# Patient Record
(demographics unavailable — no encounter records)

---

## 2024-11-15 NOTE — HISTORY OF PRESENT ILLNESS
[FreeTextEntry1] : cpe [de-identified] : 37F with PMH of colon cancer diagnosed in 2020 s/p chemo (finished in 2021), radiation therapy (11/2020), and colostomy reversal (2021) here for cpe. Pt came from [place] in June, 2024. Pt reports amenorrhea since treatment of cancer. Pt has hx of HSIL in 2023 with subsequent conization of cervix revealing low-grade squamous interepithelial lesions and surgical margin free of lesions.  No other acute complaints.

## 2024-11-15 NOTE — HISTORY OF PRESENT ILLNESS
[FreeTextEntry1] : cpe [de-identified] : 37F with PMH of colon cancer diagnosed in 2020 s/p chemo (finished in 2021), radiation therapy (11/2020), and colostomy reversal (2021) here for cpe. Pt came from [place] in June, 2024. Pt reports amenorrhea since treatment of cancer. Pt has hx of HSIL in 2023 with subsequent conization of cervix revealing low-grade squamous interepithelial lesions and surgical margin free of lesions.  No other acute complaints.

## 2024-11-15 NOTE — PHYSICAL EXAM
[No Acute Distress] : no acute distress [Well Nourished] : well nourished [Well Developed] : well developed [Well-Appearing] : well-appearing [Normal Sclera/Conjunctiva] : normal sclera/conjunctiva [PERRL] : pupils equal round and reactive to light [EOMI] : extraocular movements intact [Normal Outer Ear/Nose] : the outer ears and nose were normal in appearance [Normal Oropharynx] : the oropharynx was normal [Supple] : supple [No Respiratory Distress] : no respiratory distress  [No Accessory Muscle Use] : no accessory muscle use [Clear to Auscultation] : lungs were clear to auscultation bilaterally [Normal Rate] : normal rate  [Regular Rhythm] : with a regular rhythm [Normal S1, S2] : normal S1 and S2 [No Murmur] : no murmur heard [No Edema] : there was no peripheral edema [Soft] : abdomen soft [Non Tender] : non-tender [Non-distended] : non-distended [No Masses] : no abdominal mass palpated [Normal Bowel Sounds] : normal bowel sounds [No Spinal Tenderness] : no spinal tenderness [No Joint Swelling] : no joint swelling [Grossly Normal Strength/Tone] : grossly normal strength/tone [No Rash] : no rash [Normal Gait] : normal gait [Alert and Oriented x3] : oriented to person, place, and time [de-identified] : well healed incision scar on left lower abdomen

## 2024-11-15 NOTE — REVIEW OF SYSTEMS
[Constipation] : constipation [Diarrhea] : diarrhea [Negative] : Heme/Lymph [FreeTextEntry8] : ammenorrhea

## 2024-11-15 NOTE — PHYSICAL EXAM
[No Acute Distress] : no acute distress [Well Nourished] : well nourished [Well Developed] : well developed [Well-Appearing] : well-appearing [Normal Sclera/Conjunctiva] : normal sclera/conjunctiva [PERRL] : pupils equal round and reactive to light [EOMI] : extraocular movements intact [Normal Outer Ear/Nose] : the outer ears and nose were normal in appearance [Normal Oropharynx] : the oropharynx was normal [Supple] : supple [No Respiratory Distress] : no respiratory distress  [No Accessory Muscle Use] : no accessory muscle use [Clear to Auscultation] : lungs were clear to auscultation bilaterally [Normal Rate] : normal rate  [Regular Rhythm] : with a regular rhythm [Normal S1, S2] : normal S1 and S2 [No Murmur] : no murmur heard [No Edema] : there was no peripheral edema [Soft] : abdomen soft [Non Tender] : non-tender [Non-distended] : non-distended [No Masses] : no abdominal mass palpated [Normal Bowel Sounds] : normal bowel sounds [No Spinal Tenderness] : no spinal tenderness [No Joint Swelling] : no joint swelling [Grossly Normal Strength/Tone] : grossly normal strength/tone [No Rash] : no rash [Normal Gait] : normal gait [Alert and Oriented x3] : oriented to person, place, and time [de-identified] : well healed incision scar on left lower abdomen

## 2024-11-15 NOTE — HEALTH RISK ASSESSMENT
[Fair] :  ~his/her~ mood as fair [No] : In the past 12 months have you used drugs other than those required for medical reasons? No [0] : 2) Feeling down, depressed, or hopeless: Not at all (0) [With Family] : lives with family [Unemployed] : unemployed [Single] : single [# Of Children ___] : has [unfilled] children [Feels Safe at Home] : Feels safe at home [Fully functional (bathing, dressing, toileting, transferring, walking, feeding)] : Fully functional (bathing, dressing, toileting, transferring, walking, feeding) [Fully functional (using the telephone, shopping, preparing meals, housekeeping, doing laundry, using] : Fully functional and needs no help or supervision to perform IADLs (using the telephone, shopping, preparing meals, housekeeping, doing laundry, using transportation, managing medications and managing finances) [Reports normal functional visual acuity (ie: able to read med bottle)] : Reports normal functional visual acuity [Never] : Never [de-identified] : no [de-identified] : well balance diet [[id number]] : 0 [Sexually Active] : not sexually active [High Risk Behavior] : no high risk behavior [Reports changes in hearing] : Reports no changes in hearing [Reports changes in vision] : Reports no changes in vision [Reports changes in dental health] : Reports no changes in dental health [de-identified] : sisters  [FreeTextEntry3] : 17 years old

## 2024-11-15 NOTE — HEALTH RISK ASSESSMENT
[Fair] :  ~his/her~ mood as fair [No] : In the past 12 months have you used drugs other than those required for medical reasons? No [0] : 2) Feeling down, depressed, or hopeless: Not at all (0) [With Family] : lives with family [Unemployed] : unemployed [Single] : single [# Of Children ___] : has [unfilled] children [Feels Safe at Home] : Feels safe at home [Fully functional (bathing, dressing, toileting, transferring, walking, feeding)] : Fully functional (bathing, dressing, toileting, transferring, walking, feeding) [Fully functional (using the telephone, shopping, preparing meals, housekeeping, doing laundry, using] : Fully functional and needs no help or supervision to perform IADLs (using the telephone, shopping, preparing meals, housekeeping, doing laundry, using transportation, managing medications and managing finances) [Reports normal functional visual acuity (ie: able to read med bottle)] : Reports normal functional visual acuity [Never] : Never [de-identified] : no [de-identified] : well balance diet [[id number]] : 0 [Sexually Active] : not sexually active [High Risk Behavior] : no high risk behavior [Reports changes in hearing] : Reports no changes in hearing [Reports changes in vision] : Reports no changes in vision [Reports changes in dental health] : Reports no changes in dental health [de-identified] : sisters  [FreeTextEntry3] : 17 years old

## 2024-11-26 NOTE — HISTORY OF PRESENT ILLNESS
[de-identified] : 36 y/o F from [place] with PMHx of colon cancer diagnosed in 2020 s/p chemo (finished in 2021), radiation therapy (11/2020), and colostomy reversal (2021), s/p conization 3/2023 in [place]- presents to clinic for pap smear. Family history of cancer- grandmother (doesn't know what type). Denies abnormal vaginal bleeding or discharge, pain during intercourse, pelvic/back pain, or unintentional weight loss. LMP on 08/2020. Pt says since getting chemotherapy she has not had a menstrual period.

## 2024-11-26 NOTE — PHYSICAL EXAM
[No Acute Distress] : no acute distress [Well-Appearing] : well-appearing [Supple] : supple [No Respiratory Distress] : no respiratory distress  [No Accessory Muscle Use] : no accessory muscle use [Clear to Auscultation] : lungs were clear to auscultation bilaterally [Normal Rate] : normal rate  [Regular Rhythm] : with a regular rhythm [Normal S1, S2] : normal S1 and S2 [Soft] : abdomen soft [Non Tender] : non-tender [Non-distended] : non-distended [Vagina] : normal vaginal exam [No Focal Deficits] : no focal deficits [Normal Gait] : normal gait [Normal Affect] : the affect was normal [Alert and Oriented x3] : oriented to person, place, and time [de-identified] : chaperone PCA Barbara

## 2024-11-26 NOTE — HISTORY OF PRESENT ILLNESS
[de-identified] : 38 y/o F from [place] with PMHx of colon cancer diagnosed in 2020 s/p chemo (finished in 2021), radiation therapy (11/2020), and colostomy reversal (2021), s/p conization 3/2023 in [place]- presents to clinic for pap smear. Family history of cancer- grandmother (doesn't know what type). Denies abnormal vaginal bleeding or discharge, pain during intercourse, pelvic/back pain, or unintentional weight loss. LMP on 08/2020. Pt says since getting chemotherapy she has not had a menstrual period.

## 2024-11-26 NOTE — PHYSICAL EXAM
[No Acute Distress] : no acute distress [Well-Appearing] : well-appearing [Supple] : supple [No Respiratory Distress] : no respiratory distress  [No Accessory Muscle Use] : no accessory muscle use [Clear to Auscultation] : lungs were clear to auscultation bilaterally [Normal Rate] : normal rate  [Regular Rhythm] : with a regular rhythm [Normal S1, S2] : normal S1 and S2 [Soft] : abdomen soft [Non Tender] : non-tender [Non-distended] : non-distended [Vagina] : normal vaginal exam [No Focal Deficits] : no focal deficits [Normal Gait] : normal gait [Normal Affect] : the affect was normal [Alert and Oriented x3] : oriented to person, place, and time [de-identified] : chaperone PCA Barbara

## 2025-01-16 NOTE — HISTORY OF PRESENT ILLNESS
[FreeTextEntry1] :  ID: [id number] 37F with history of colon cancer 2020 s/p colostomy (8/2020), chemo/radiation therapy, colostomy reversal 3/2021 presenting to establish care for CRC follow up.  *** reportedly had colostomy but no colonic resection colonoscopy 2023: normal intermittent abdominal pain, depending on what she eats weight loss of 30lbs over the past 6 months no nausea/vomiting, melena, hematochezia, constipation  has siblings and children;

## 2025-01-16 NOTE — HISTORY OF PRESENT ILLNESS
[FreeTextEntry1] :  ID: [id number] 37F with history of colon cancer 2020 s/p colostomy (8/2020), chemo/radiation therapy, colostomy reversal 3/2021 presenting to establish care for CRC follow up.  *** reportedly had colostomy but no colonic resection colonoscopy 2023: normal intermittent abdominal pain, depending on what she eats weight loss of 30lbs over the past 6 months no nausea/vomiting, melena, hematochezia, constipation  has siblings and children;

## 2025-01-16 NOTE — ASSESSMENT
[FreeTextEntry1] :    Siblings should have colonoscopy screening and children should begin screening by colonoscopy at age 23.